# Patient Record
Sex: FEMALE | Race: WHITE | NOT HISPANIC OR LATINO | URBAN - NONMETROPOLITAN AREA
[De-identification: names, ages, dates, MRNs, and addresses within clinical notes are randomized per-mention and may not be internally consistent; named-entity substitution may affect disease eponyms.]

---

## 2017-04-13 ENCOUNTER — OFFICE VISIT (OUTPATIENT)
Dept: ORTHOPEDIC SURGERY | Facility: CLINIC | Age: 62
End: 2017-04-13

## 2017-04-13 DIAGNOSIS — S83.411A SPRAIN OF MEDIAL COLLATERAL LIGAMENT OF RIGHT KNEE, INITIAL ENCOUNTER: ICD-10-CM

## 2017-04-13 DIAGNOSIS — M25.561 ACUTE PAIN OF RIGHT KNEE: ICD-10-CM

## 2017-04-13 DIAGNOSIS — S83.206A POSITIVE MCMURRAY SIGN (MENISCUS TEAR), RIGHT, INITIAL ENCOUNTER: Primary | ICD-10-CM

## 2017-04-13 DIAGNOSIS — M25.561 RIGHT KNEE PAIN, UNSPECIFIED CHRONICITY: Primary | ICD-10-CM

## 2017-04-13 PROCEDURE — 99203 OFFICE O/P NEW LOW 30 MIN: CPT | Performed by: PHYSICIAN ASSISTANT

## 2017-04-13 RX ORDER — SERTRALINE HYDROCHLORIDE 100 MG/1
TABLET, FILM COATED ORAL
Refills: 5 | COMMUNITY
Start: 2017-02-22

## 2017-04-13 RX ORDER — DICLOFENAC SODIUM 75 MG/1
TABLET, DELAYED RELEASE ORAL
COMMUNITY
Start: 2017-04-12 | End: 2017-04-13 | Stop reason: ALTCHOICE

## 2017-04-13 RX ORDER — GABAPENTIN 300 MG/1
CAPSULE ORAL
Refills: 5 | COMMUNITY
Start: 2017-02-22

## 2017-04-13 RX ORDER — TIZANIDINE 4 MG/1
TABLET ORAL
Refills: 5 | COMMUNITY
Start: 2017-02-22

## 2017-04-13 RX ORDER — TRAZODONE HYDROCHLORIDE 100 MG/1
TABLET ORAL
Refills: 3 | COMMUNITY
Start: 2017-02-22

## 2017-04-13 RX ORDER — TRAMADOL HYDROCHLORIDE 50 MG/1
50 TABLET ORAL EVERY 8 HOURS PRN
Qty: 15 TABLET | Refills: 0 | Status: SHIPPED | OUTPATIENT
Start: 2017-04-13

## 2017-04-13 RX ORDER — CYANOCOBALAMIN 1000 UG/ML
INJECTION, SOLUTION INTRAMUSCULAR; SUBCUTANEOUS
Refills: 11 | COMMUNITY
Start: 2017-02-22

## 2017-04-13 RX ORDER — ALPRAZOLAM 0.25 MG/1
TABLET ORAL
Refills: 1 | COMMUNITY
Start: 2017-02-22 | End: 2018-09-24 | Stop reason: ALTCHOICE

## 2017-04-13 RX ORDER — OXYBUTYNIN CHLORIDE 5 MG/1
TABLET ORAL
Refills: 5 | COMMUNITY
Start: 2017-03-16

## 2017-04-13 RX ORDER — PREDNISONE 50 MG/1
50 TABLET ORAL DAILY
Qty: 3 TABLET | Refills: 0 | Status: SHIPPED | OUTPATIENT
Start: 2017-04-13 | End: 2018-09-24

## 2017-04-13 NOTE — PROGRESS NOTES
Subjective   Patient ID: Linda Chauhan is a 61 y.o. left hand dominant female is being seen for orthopaedic evaluation today for right knee pain Pain of the Right Knee         History of Present Illness    Patient presents as a new patient to our office with complaints of right knee pain.  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  She states she felt intense pain.  Since she has had pain with ambulation.  Pain with flexion.  She was seen in the urgent treatment center and was given a knee immobilizer which she states helps somewhat.  She does have crutches at home.  She denies having any complaints to the right knee prior to this injury.  She denies numbness or tingling.  She states she was given a prescription for Almaz and from the urgent treatment center.  I did encourage her to not take this as she has had gastric bypass    Past Medical History:   Diagnosis Date   • Anxiety    • CTS (carpal tunnel syndrome)    • Depression    • Fracture         Past Surgical History:   Procedure Laterality Date   • ANKLE OPEN REDUCTION INTERNAL FIXATION     •  SECTION     • GASTRIC BYPASS     • KIDNEY STONE SURGERY         Family History   Problem Relation Age of Onset   • Hypertension Other    • Osteoarthritis Other    • Diabetes Other    • Cancer Other    • Rheumatic fever Other        Social History     Social History   • Marital status:      Spouse name: N/A   • Number of children: N/A   • Years of education: N/A     Occupational History   • RN  [employer]     Social History Main Topics   • Smoking status: Never Smoker   • Smokeless tobacco: Not on file   • Alcohol use Yes      Comment: socially    • Drug use: No   • Sexual activity: Defer     Other Topics Concern   • Not on file     Social History Narrative   • No narrative on file       No Known Allergies    Review of Systems   Constitutional: Negative  for fever.   HENT: Negative for voice change.    Eyes: Negative for visual disturbance.   Respiratory: Negative for shortness of breath.    Cardiovascular: Negative for chest pain.   Gastrointestinal: Negative for abdominal distention and abdominal pain.   Genitourinary: Negative for dysuria.   Musculoskeletal: Positive for arthralgias. Negative for gait problem and joint swelling.   Skin: Negative for rash.   Neurological: Negative for speech difficulty.   Hematological: Does not bruise/bleed easily.   Psychiatric/Behavioral: Negative for confusion.   All other systems reviewed and are negative.      Objective   There were no vitals taken for this visit.   Physical Exam   Constitutional: She is oriented to person, place, and time. She appears well-developed.   HENT:   Head: Normocephalic.   Eyes: Conjunctivae are normal.   Neck: No tracheal deviation present.   Pulmonary/Chest: Effort normal.   Musculoskeletal:        Right knee: She exhibits abnormal meniscus. She exhibits no swelling, no effusion, no ecchymosis, no erythema, no LCL laxity, normal patellar mobility, no bony tenderness and no MCL laxity. Tenderness found. Medial joint line and MCL tenderness noted. No patellar tendon tenderness noted.        Right ankle: She exhibits normal range of motion, no swelling and no ecchymosis. No tenderness.   Neurological: She is alert and oriented to person, place, and time.   Skin: No rash noted.   Psychiatric: She has a normal mood and affect. Her behavior is normal.   Vitals reviewed.    Right Knee Exam     Range of Motion   Extension: 5   Flexion: 100     Tests   Jigar:  Medial - positive   Drawer:       Anterior - negative    Posterior - negative  Varus: negative  Valgus: negative    Other   Erythema: absent  Sensation: normal  Pulse: present  Swelling: none  Other tests: no effusion present           Extremity DVT signs are Negative on physical exam with negative Reema sign, with no calf pain, with no palpable  cords, with no increased pain with passive stretch/extension and with no skin tone change   Neurologic Exam     Mental Status   Oriented to person, place, and time.      Right Knee Exam     Tenderness   The patient is experiencing tenderness in the medial joint line, no patellar tendon, MCL.               Assessment/Plan    Independent Review of Radiographic Studies:    Shows no acute fracture or dislocation.  Laboratory and Other Studies:  No new results reviewed today.       Procedures  [x] No procedures were performed in office today.     Linda was seen today for pain.    Diagnoses and all orders for this visit:    Positive Jigar sign (meniscus tear), right, initial encounter  -     MRI Knee Right Without Contrast  -     predniSONE (DELTASONE) 50 MG tablet; Take 1 tablet by mouth Daily. Take with prevacid    Sprain of medial collateral ligament of right knee, initial encounter  -     MRI Knee Right Without Contrast  -     predniSONE (DELTASONE) 50 MG tablet; Take 1 tablet by mouth Daily. Take with prevacid    Acute pain of right knee     Orthopedic activities reviewed and patient expressed appreciation  Discussion of orthopedic goals  Risk, benefits, and merits of treatment alternatives reviewed with the patient and questions answered  Elevate leg for residual swelling  Reduced physical activity as appropriate  Weight bearing parameters reviewed  Avoid offending activity  Ice, heat, and/or modalities as beneficial    Recommendations/Plan:  Exercise, medications, injections, other patient advice, and return appointment as noted.  Patient is encouraged to call or return for any issues or concerns.  Work status form was provided.  Patient is encouraged to use the brace and crutches to alleviate and minimize weightbearing.  She is advised to take over-the-counter Zantac while she takes the steroids medicine.  FU after MRI  Patient agreeable to call or return sooner for any concerns.

## 2017-05-10 ENCOUNTER — OFFICE VISIT (OUTPATIENT)
Dept: ORTHOPEDIC SURGERY | Facility: CLINIC | Age: 62
End: 2017-05-10

## 2017-05-10 VITALS — RESPIRATION RATE: 18 BRPM | WEIGHT: 190 LBS | HEIGHT: 64 IN | BODY MASS INDEX: 32.44 KG/M2

## 2017-05-10 DIAGNOSIS — M17.12 PRIMARY OSTEOARTHRITIS OF LEFT KNEE: ICD-10-CM

## 2017-05-10 DIAGNOSIS — M25.561 RIGHT KNEE PAIN, UNSPECIFIED CHRONICITY: Primary | ICD-10-CM

## 2017-05-10 DIAGNOSIS — S83.241D OTHER TEAR OF MEDIAL MENISCUS OF RIGHT KNEE AS CURRENT INJURY, SUBSEQUENT ENCOUNTER: ICD-10-CM

## 2017-05-10 DIAGNOSIS — M94.261 CHONDROMALACIA OF KNEE, RIGHT: ICD-10-CM

## 2017-05-10 PROCEDURE — 99213 OFFICE O/P EST LOW 20 MIN: CPT | Performed by: PHYSICIAN ASSISTANT

## 2018-09-27 ENCOUNTER — OFFICE VISIT (OUTPATIENT)
Dept: ORTHOPEDIC SURGERY | Facility: CLINIC | Age: 63
End: 2018-09-27

## 2018-09-27 VITALS — HEIGHT: 63 IN | RESPIRATION RATE: 18 BRPM | BODY MASS INDEX: 31.89 KG/M2 | WEIGHT: 180 LBS

## 2018-09-27 DIAGNOSIS — M25.562 ARTHRALGIA OF LEFT KNEE: Primary | ICD-10-CM

## 2018-09-27 DIAGNOSIS — M17.12 PRIMARY OSTEOARTHRITIS OF LEFT KNEE: ICD-10-CM

## 2018-09-27 PROCEDURE — 20610 DRAIN/INJ JOINT/BURSA W/O US: CPT | Performed by: PHYSICIAN ASSISTANT

## 2018-09-27 PROCEDURE — 99213 OFFICE O/P EST LOW 20 MIN: CPT | Performed by: PHYSICIAN ASSISTANT

## 2018-09-27 RX ORDER — LIDOCAINE HYDROCHLORIDE 10 MG/ML
1 INJECTION, SOLUTION INFILTRATION; PERINEURAL
Status: COMPLETED | OUTPATIENT
Start: 2018-09-27 | End: 2018-09-27

## 2018-09-27 RX ORDER — METHYLPREDNISOLONE ACETATE 40 MG/ML
40 INJECTION, SUSPENSION INTRA-ARTICULAR; INTRALESIONAL; INTRAMUSCULAR; SOFT TISSUE
Status: COMPLETED | OUTPATIENT
Start: 2018-09-27 | End: 2018-09-27

## 2018-09-27 RX ADMIN — LIDOCAINE HYDROCHLORIDE 1 ML: 10 INJECTION, SOLUTION INFILTRATION; PERINEURAL at 14:15

## 2018-09-27 RX ADMIN — METHYLPREDNISOLONE ACETATE 40 MG: 40 INJECTION, SUSPENSION INTRA-ARTICULAR; INTRALESIONAL; INTRAMUSCULAR; SOFT TISSUE at 14:15

## 2018-09-27 NOTE — PROGRESS NOTES
"Subjective   Patient ID: Linda hCauhan is a 63 y.o.  female  Pain and Knee Injury of the Left Knee         History of Present Illness  Patient presents with complaints of left knee pain.  She states she twisted her knee 1 to answer the phone and since has been having locking sensation to the left knee.  She denies redness or warmth.  Denies blunt trauma.  She went to the urgent treatment center initially had x-rays which were negative for acute osseous abnormality.  She is in the process of trying to get an MRI approved that was ordered from the urgent care.  Patient cannot have anti-inflammatories due to history of gastric bypass.    Pain Score: 7  Pain Location: Knee  Pain Orientation: Left     Pain Descriptors: Aching, Throbbing (\"pops\" when walking)  Pain Frequency: Constant/continuous  Pain Onset: Ongoing  Date Pain First Started: 18  Clinical Progression: Not changed  Aggravating Factors: Walking, Standing, Bending        Pain Intervention(s): Rest, Elevated (using a crutch, [place] prescribed by [facility])  Result of Injury: Yes (jumped up to answer phone, knee popped then was unable to walk on it)       Past Medical History:   Diagnosis Date   • Anxiety    • CTS (carpal tunnel syndrome)    • Depression    • Fracture         Past Surgical History:   Procedure Laterality Date   • ANKLE OPEN REDUCTION INTERNAL FIXATION     •  SECTION     • GASTRIC BYPASS     • KIDNEY STONE SURGERY         Family History   Problem Relation Age of Onset   • Hypertension Other    • Osteoarthritis Other    • Diabetes Other    • Cancer Other    • Rheumatic fever Other        Social History     Social History   • Marital status:      Spouse name: N/A   • Number of children: N/A   • Years of education: N/A     Occupational History   • RN  [employer]     Social History Main Topics   • Smoking status: Never Smoker   • Smokeless tobacco: Not on file   • Alcohol use Yes      Comment: socially    • Drug use: No   • " "Sexual activity: Defer     Other Topics Concern   • Not on file     Social History Narrative   • No narrative on file         Current Outpatient Prescriptions:   •  cyanocobalamin 1000 MCG/ML injection, INJECT 1 ML EACH MONTH., Disp: , Rfl: 11  •  gabapentin (NEURONTIN) 300 MG capsule, TAKE 1 CAPSULE BY MOUTH THREE TIMES A DAY, Disp: , Rfl: 5  •  HYDROcodone-acetaminophen (NORCO) 7.5-325 MG per tablet, Take 1 tablet by mouth Every 4 (Four) Hours As Needed for Moderate Pain ., Disp: 15 tablet, Rfl: 0  •  oxybutynin (DITROPAN) 5 MG tablet, TAKE 1 TABLET BY MOUTH TWO TIMES A DAY, Disp: , Rfl: 5  •  sertraline (ZOLOFT) 100 MG tablet, TAKE 2 TABLETS BY MOUTH ONE TIME A DAY, Disp: , Rfl: 5  •  tiZANidine (ZANAFLEX) 4 MG tablet, TAKE 1 TABLET BY MOUTH THREE TIMES A DAY, Disp: , Rfl: 5  •  traMADol (ULTRAM) 50 MG tablet, Take 1 tablet by mouth Every 8 (Eight) Hours As Needed (for pain)., Disp: 15 tablet, Rfl: 0  •  traZODone (DESYREL) 100 MG tablet, TAKE 1 TABLET BY MOUTH AT BEDTIME AS NEEDED, Disp: , Rfl: 3  •  Venlafaxine HCl (EFFEXOR XR PO), Take 100 mg by mouth Daily., Disp: , Rfl:     Allergies   Allergen Reactions   • Contrast Dye Rash   • Penicillins Rash       Review of Systems   Constitutional: Negative for fever.   HENT: Negative for voice change.    Eyes: Negative for visual disturbance.   Respiratory: Negative for shortness of breath.    Cardiovascular: Negative for chest pain.   Gastrointestinal: Negative for abdominal distention and abdominal pain.   Genitourinary: Negative for dysuria.   Musculoskeletal: Positive for arthralgias. Negative for gait problem and joint swelling.   Skin: Negative for rash.   Neurological: Negative for speech difficulty.   Hematological: Does not bruise/bleed easily.   Psychiatric/Behavioral: Negative for confusion.       Objective   Resp 18   Ht 160 cm (63\")   Wt 81.6 kg (180 lb)   BMI 31.89 kg/m²    Physical Exam   Constitutional: She is oriented to person, place, and time. " She appears well-nourished.   Eyes: Conjunctivae are normal.   Neck: No tracheal deviation present.   Pulmonary/Chest: No respiratory distress.   Musculoskeletal:        Left knee: She exhibits decreased range of motion. She exhibits no swelling, no ecchymosis and no erythema. Tenderness found. Medial joint line tenderness noted. No lateral joint line, no MCL and no LCL tenderness noted.        Left ankle: No tenderness. No lateral malleolus and no medial malleolus tenderness found.   Neurological: She is alert and oriented to person, place, and time.   Skin: Capillary refill takes less than 2 seconds.   Psychiatric: She has a normal mood and affect.   Vitals reviewed.    Left Knee Exam     Range of Motion   Extension: 5   Flexion: 100     Other   Erythema: absent  Sensation: normal  Pulse: present           Extremity DVT signs are Negative on physical exam with negative Reema sign, with no calf pain, with no palpable cords, with no increased pain with passive stretch/extension and with no skin tone change   Neurologic Exam     Mental Status   Oriented to person, place, and time.      Left Ankle Exam     Tenderness   The patient is experiencing tenderness in the no medial malleolus.    Left Knee Exam     Tenderness   The patient is experiencing tenderness in the medial joint line, no lateral joint line, no MCL, no LCL.               Assessment/Plan   Independent Review of Radiographic Studies:    X-ray of the left knee weight bearing status in the office reveal severe medial joint space narrowing with bone-on-bone and retraction.      Large Joint Arthrocentesis  Date/Time: 9/27/2018 2:15 PM  Consent given by: patient  Site marked: site marked  Timeout: Immediately prior to procedure a time out was called to verify the correct patient, procedure, equipment, support staff and site/side marked as required   Supporting Documentation  Indications: pain   Procedure Details  Location: knee - L knee  Preparation: Patient  was prepped and draped in the usual sterile fashion  Needle size: 22 G  Approach: anterolateral  Medications administered: 1 mL lidocaine 1 %; 40 mg methylPREDNISolone acetate 40 MG/ML  Patient tolerance: patient tolerated the procedure well with no immediate complications      2 mL Lidocaine 1% was used in injection       Linda was seen today for pain and knee injury.    Diagnoses and all orders for this visit:    Arthralgia of left knee  -     XR Knee 1 or 2 View Left  -     Large Joint Arthrocentesis    Primary osteoarthritis of left knee  -     Large Joint Arthrocentesis       Regular exercise as tolerated  Orthopedic activities reviewed and patient expressed appreciation  Discussion of orthopedic goals  Risk, benefits, and merits of treatment alternatives reviewed with the patient and questions answered  Call or notify for any adverse effect from injection therapy    Recommendations/Plan:  Exercise, medications, injections, other patient advice, and return appointment as noted.  Patient is encouraged to call or return for any issues or concerns.  Use brace and crutch as needed  Suspect she may have macerated the meniscus as she is bone-on-bone to the medial joint space.  I do not feel a surgical arthroscopy would benefit her due to the joint space narrowing  An MRI might be beneficial to tell us about the distal lateral femoral condyle if she is very arthritic this may prevent her from having a partial knee replacement but we discussed the option of an injection today with physical therapy versus knee replacement.  She states she would like to hold off on any thoughts of surgery until her insurance is straightened out  Patient agreeable to call or return sooner for any concerns.

## 2018-10-16 ENCOUNTER — TELEPHONE (OUTPATIENT)
Dept: ORTHOPEDIC SURGERY | Facility: CLINIC | Age: 63
End: 2018-10-16

## 2018-11-20 ENCOUNTER — TELEPHONE (OUTPATIENT)
Dept: ORTHOPEDIC SURGERY | Facility: CLINIC | Age: 63
End: 2018-11-20

## 2018-11-20 NOTE — TELEPHONE ENCOUNTER
ext [id number]. Per Calvin we are not able to fill out [facility] form. Patient was seen here one time in september and failed to follow up. Patient may have transfered care, see telephone encounter requesting xray images on disc.

## 2021-03-16 ENCOUNTER — IMMUNIZATION (OUTPATIENT)
Dept: VACCINE CLINIC | Facility: HOSPITAL | Age: 66
End: 2021-03-16

## 2021-03-16 PROCEDURE — 91300 HC SARSCOV02 VAC 30MCG/0.3ML IM: CPT | Performed by: INTERNAL MEDICINE

## 2021-03-16 PROCEDURE — 0001A: CPT | Performed by: INTERNAL MEDICINE

## 2021-04-06 ENCOUNTER — IMMUNIZATION (OUTPATIENT)
Dept: VACCINE CLINIC | Facility: HOSPITAL | Age: 66
End: 2021-04-06

## 2021-04-06 PROCEDURE — 0002A: CPT | Performed by: INTERNAL MEDICINE

## 2021-04-06 PROCEDURE — 91300 HC SARSCOV02 VAC 30MCG/0.3ML IM: CPT | Performed by: INTERNAL MEDICINE

## 2021-12-13 ENCOUNTER — TRANSCRIBE ORDERS (OUTPATIENT)
Dept: ADMINISTRATIVE | Facility: HOSPITAL | Age: 66
End: 2021-12-13

## 2021-12-13 DIAGNOSIS — Z78.0 ASYMPTOMATIC MENOPAUSAL STATE: Primary | ICD-10-CM

## 2021-12-13 DIAGNOSIS — Z12.31 ENCOUNTER FOR SCREENING MAMMOGRAM FOR MALIGNANT NEOPLASM OF BREAST: ICD-10-CM

## 2022-12-02 ENCOUNTER — TRANSCRIBE ORDERS (OUTPATIENT)
Dept: ADMINISTRATIVE | Facility: HOSPITAL | Age: 67
End: 2022-12-02

## 2022-12-02 DIAGNOSIS — Z78.0 ASYMPTOMATIC MENOPAUSAL STATE: ICD-10-CM

## 2022-12-02 DIAGNOSIS — Z12.31 VISIT FOR SCREENING MAMMOGRAM: Primary | ICD-10-CM

## 2022-12-09 ENCOUNTER — APPOINTMENT (OUTPATIENT)
Dept: BONE DENSITY | Facility: HOSPITAL | Age: 67
End: 2022-12-09

## 2022-12-09 DIAGNOSIS — Z78.0 ASYMPTOMATIC MENOPAUSAL STATE: ICD-10-CM

## 2022-12-09 PROCEDURE — 77080 DXA BONE DENSITY AXIAL: CPT
